# Patient Record
(demographics unavailable — no encounter records)

---

## 2017-06-05 NOTE — [facility]
MR of the right knee

 

HISTORY: Acute pain and fell 3 weeks ago. Pain and swelling posterior.

 

TECHNIQUE: Routine multiplanar sequences are obtained.

 

FINDINGS:

Tear of the posterior horn of the medial meniscus.

No evidence of a lateral meniscal tear.

 

Anterior and posterior cruciate ligaments are intact. Medial collateral 

ligament demonstrates mild proximal sprain or scarring.

 

Iliotibial band unremarkable. Fibular collateral ligament, biceps femoris 

tendon and popliteus tendon are intact.

 

Patellar tendon intact. Small ossicle at the tubercle attachment. 

Quadriceps tendon intact with small enthesophyte at its patellar 

attachment.

 

Moderate medial joint compartment chondromalacia.

No bone lesion or acute fracture.

 

Small Ortiz's cyst with disorganized fluid above and below the cyst 

compatible with rupture. There is some generalized soft tissue edema 

around the knee.

 

IMPRESSION:

1. Medial meniscal tear.

2. Small ruptured Baker cyst.

3. Primary osteoarthritis.

 

Electronically signed by: Raf Auguste MD (6/5/2017 4:05 PM)

## 2017-06-05 NOTE — [facility]
MRI for orbital clearance

 

 

Indication: MRI screening. History of metal working.

 

FINDINGS:

 

There is no radiopaque foreign body identified overlying the orbits.

 

IMPRESSION:

 

No evidence for metallic foreign body overlying the orbits.

 

Electronically signed by: Osvaldo Gallagher MD (6/5/2017 2:47 PM)